# Patient Record
Sex: MALE | URBAN - METROPOLITAN AREA
[De-identification: names, ages, dates, MRNs, and addresses within clinical notes are randomized per-mention and may not be internally consistent; named-entity substitution may affect disease eponyms.]

---

## 2024-05-29 ENCOUNTER — EMERGENCY (EMERGENCY)
Facility: HOSPITAL | Age: 69
LOS: 1 days | Discharge: LEFT BEFORE DISCHARGE | End: 2024-05-29
Admitting: EMERGENCY MEDICINE
Payer: SELF-PAY

## 2024-05-29 VITALS
DIASTOLIC BLOOD PRESSURE: 98 MMHG | TEMPERATURE: 98 F | RESPIRATION RATE: 18 BRPM | OXYGEN SATURATION: 97 % | SYSTOLIC BLOOD PRESSURE: 170 MMHG | HEART RATE: 90 BPM

## 2024-05-29 PROCEDURE — L9991: CPT

## 2024-05-29 NOTE — ED PROVIDER NOTE - CLINICAL SUMMARY MEDICAL DECISION MAKING FREE TEXT BOX
I did not see the patient after he was triaged.  When I went into the room there was no one checked the emergency room including bathrooms but patient was not located.  Called patient 3 times on phone number in chart [phone], voicemail left stating if patient is still in the emergency room to let staff or nurse member know so that they can be evaluated otherwise they are welcome to return back at any time for assessment and management.  I do not know the status of the patient or stability in triage, while they were in the emergency room, or when they left as I did not see the patient and the left without being seen.

## 2024-05-29 NOTE — ED ADULT TRIAGE NOTE - CHIEF COMPLAINT QUOTE
patient states" I was using kitchen knife and I cut my self on my left pinky finger today " denies use of AC.

## 2024-05-29 NOTE — ED PROVIDER NOTE - OBJECTIVE STATEMENT
I did not see the patient after he was triaged.  When I went into the room there was no one checked the emergency room including bathrooms but patient was not located.  Called patient 3 times on phone number in chart [phone], voicemail left stating if patient is still in the emergency room to let staff or nurse member know so that they can be evaluated otherwise they are welcome to return back at any time for assessment and management.  I do not know the status of the patient or stability in triage, while they were in the emergency room, or when they left as I did not see the patient and the left without being seen.